# Patient Record
Sex: MALE | Race: OTHER | Employment: PART TIME | URBAN - METROPOLITAN AREA
[De-identification: names, ages, dates, MRNs, and addresses within clinical notes are randomized per-mention and may not be internally consistent; named-entity substitution may affect disease eponyms.]

---

## 2017-03-16 RX ORDER — DESLORATADINE 5 MG/1
5 TABLET ORAL
Qty: 30 TAB | Refills: 5 | Status: SHIPPED | OUTPATIENT
Start: 2017-03-16 | End: 2017-04-14

## 2017-04-14 ENCOUNTER — OFFICE VISIT (OUTPATIENT)
Dept: PEDIATRICS CLINIC | Age: 22
End: 2017-04-14

## 2017-04-14 VITALS
TEMPERATURE: 98 F | SYSTOLIC BLOOD PRESSURE: 117 MMHG | WEIGHT: 120.1 LBS | BODY MASS INDEX: 17.79 KG/M2 | HEIGHT: 69 IN | DIASTOLIC BLOOD PRESSURE: 73 MMHG | HEART RATE: 67 BPM | OXYGEN SATURATION: 98 %

## 2017-04-14 DIAGNOSIS — H10.10 ALLERGIC CONJUNCTIVITIS, UNSPECIFIED LATERALITY: ICD-10-CM

## 2017-04-14 DIAGNOSIS — J30.89 NON-SEASONAL ALLERGIC RHINITIS DUE TO OTHER ALLERGIC TRIGGER: Primary | ICD-10-CM

## 2017-04-14 DIAGNOSIS — H10.13 ALLERGIC CONJUNCTIVITIS, BILATERAL: ICD-10-CM

## 2017-04-14 RX ORDER — CETIRIZINE HCL 10 MG
10 TABLET ORAL
Qty: 30 TAB | Refills: 3 | Status: SHIPPED | OUTPATIENT
Start: 2017-04-14

## 2017-04-14 RX ORDER — KETOTIFEN FUMARATE 0.35 MG/ML
1 SOLUTION/ DROPS OPHTHALMIC
Qty: 1 BOTTLE | Refills: 1 | Status: SHIPPED | OUTPATIENT
Start: 2017-04-14

## 2017-04-14 NOTE — PROGRESS NOTES
HISTORY OF PRESENT ILLNESS  Shashank Dumas is a 25 y.o. male brought by self     HPI   Here today for allergy concerns. Clarinex not working. Runny/stuffy nose and itchy eyes reported for a few days. No itchy throat or PND. No recent fevers. Otherwise eating/drinking well. Medications:  Clarinex    No Known Allergies    Review of Systems   Constitutional: Negative for fever and malaise/fatigue. HENT: Negative for ear pain. Eyes: Positive for redness. Negative for pain and discharge. Respiratory: Negative. Cardiovascular: Negative. Gastrointestinal: Negative. Skin: Negative for rash. Visit Vitals    /73    Pulse 67    Temp 98 °F (36.7 °C) (Oral)    Ht 5' 9.29\" (1.76 m)    Wt 120 lb 1.6 oz (54.5 kg)    SpO2 98%    BMI 17.59 kg/m2       Physical Exam   Constitutional: He appears well-developed and well-nourished. No distress. HENT:   Right Ear: External ear normal.   Left Ear: External ear normal.   Mouth/Throat: No oropharyngeal exudate. Congested nasal mucosa. Pale turbinates. Eyes: EOM are normal. Pupils are equal, round, and reactive to light. Right eye exhibits no discharge. Left eye exhibits no discharge. Sclera with minimal injection bilaterally. No eyelid edema. Neck: Normal range of motion. Neck supple. Cardiovascular: Normal rate and regular rhythm. Pulmonary/Chest: Effort normal and breath sounds normal. No respiratory distress. Abdominal: Soft. He exhibits no distension. There is no tenderness. Musculoskeletal: Normal range of motion. Lymphadenopathy:     He has no cervical adenopathy. Neurological: He is alert. Skin: Skin is warm. Psychiatric: He has a normal mood and affect. Thought content normal.   Nursing note and vitals reviewed. ASSESSMENT and PLAN  Encounter Diagnoses   Name Primary?     Non-seasonal allergic rhinitis due to other allergic trigger Yes    Allergic conjunctivitis, bilateral     Allergic conjunctivitis, unspecified laterality        Plan:  Orders Placed This Encounter    cetirizine (ZYRTEC) 10 mg tablet    fluticasone (FLONASE SENSIMIST) 27.5 mcg/actuation nasal spray    ketotifen (ZADITOR) 0.025 % (0.035 %) ophthalmic solution    Handout/AVS given and discussed re. Allergic rhinitis, allergic conjunctivitis, and avoidance of triggers. Follow up if sx persist, worsen, concerns.

## 2017-04-14 NOTE — PATIENT INSTRUCTIONS
Seasonal Allergies: Care Instructions  Your Care Instructions  Allergies occur when your body's defense system (immune system) overreacts to certain substances. The immune system treats a harmless substance as if it were a harmful germ or virus. Many things can cause this to happen. Examples include pollens, medicine, food, dust, animal dander, and mold. Your allergies are seasonal if you have symptoms just at certain times of the year. In that case, you are probably allergic to pollens from certain trees, grasses, or weeds. Allergies can be mild or severe. Over-the-counter allergy medicine may help with some symptoms. Read and follow all instructions on the label. Managing your allergies is an important part of staying healthy. Your doctor may suggest that you have tests to help find the cause of your allergies. When you know what things trigger your symptoms, you can avoid them. This can prevent allergy symptoms and other health problems. In some cases, immunotherapy might help. For this treatment, you get shots or use pills that have a small amount of certain allergens in them. Your body \"gets used to\" the allergen, so you react less to it over time. This kind of treatment may help prevent or reduce some allergy symptoms. Follow-up care is a key part of your treatment and safety. Be sure to make and go to all appointments, and call your doctor if you are having problems. It's also a good idea to know your test results and keep a list of the medicines you take. How can you care for yourself at home? · Be safe with medicines. Take your medicines exactly as prescribed. Call your doctor if you think you are having a problem with your medicine. · During your allergy season, keep windows closed. If you need to use air-conditioning, change or clean all filters every month. Take a shower and change your clothes after you have been outside. · Stay inside when pollen counts are high.  Vacuum once or twice a week. Use a vacuum  with a HEPA filter or a double-thickness filter. When should you call for help? Call 911 anytime you think you may need emergency care. For example, call if:  · You have symptoms of a severe allergic reaction. These may include:  ¨ Sudden raised, red areas (hives) all over your body. ¨ Swelling of the throat, mouth, lips, or tongue. ¨ Trouble breathing. ¨ Passing out (losing consciousness). Or you may feel very lightheaded or suddenly feel weak, confused, or restless. Watch closely for changes in your health, and be sure to contact your doctor if:  · You need help controlling your allergies. · You have questions about allergy testing. · You do not get better as expected. Where can you learn more? Go to http://vickiCheviaroberto.info/. Enter J912 in the search box to learn more about \"Seasonal Allergies: Care Instructions. \"  Current as of: February 12, 2016  Content Version: 11.2  © 6847-6102 [employer]. Care instructions adapted under license by [employer] (which disclaims liability or warranty for this information). If you have questions about a medical condition or this instruction, always ask your healthcare professional. [place] [address] any warranty or liability for your use of this information. Using a Nasal Steroid Spray: Care Instructions  Your Care Instructions    Your doctor may suggest using a corticosteroid nasal spray for your allergy symptoms or sinus problems. These sprays reduce the swelling inside the nose and sinuses. Unlike decongestant nasal sprays, steroid sprays won't lead to more swelling when you stop taking them. These sprays start working in a few days, but it may take several weeks before you get the full effect. Most side effects are minor. The most common complaint is a burning feeling in the nose right after the spray is used. Some people get nosebleeds.   Follow-up care is a key part of your treatment and safety. Be sure to make and go to all appointments, and call your doctor if you are having problems. It's also a good idea to know your test results and keep a list of the medicines you take. How can you care for yourself at home? Here are some tips for using these sprays:  · You may need to prime the sprayer before you use it. This means spraying it into the air a few times to make sure you get the right amount of medicine. Follow the directions on the label. · Blow your nose before you spray. This will help clear out your nostrils. · Gently sniff the medicine into your nose as you spray. Don't snort, or the medicine will go all the way into your throat where it won't do much good. · Aim the nozzle straight toward the outer wall of your nostril. This will help keep the medicine from irritating the inner walls of your nose, especially your septum (the wall that separates your left and right nostrils). · Don't blow your nose for 10 minutes or so after you spray. And try not to sneeze. · Be safe with medicines. Use this medicine exactly as prescribed. Call your doctor if you think you are having a problem with your medicine. · Clean your sprayer once a week. Read the label to learn how. When should you call for help? Call your doctor now or seek immediate medical care if:  · You don't understand how to use the medicine. · Your symptoms aren't getting better as expected. · You think you are having a side effect from the medicine. Where can you learn more? Go to http://vicki-roberto.info/. Enter V402 in the search box to learn more about \"Using a Nasal Steroid Spray: Care Instructions. \"  Current as of: September 20, 2016  Content Version: 11.2  © 3627-5583 [employer]. Care instructions adapted under license by [employer] (which disclaims liability or warranty for this information).  If you have questions about a medical condition or this instruction, always ask your healthcare professional. [place] [address] any warranty or liability for your use of this information. Allergic Conjunctivitis in Children: Care Instructions  Your Care Instructions    Allergic conjunctivitis (say \"[id number]-KUSD-[id number]-VY-tus\") is an eye problem that many children get. It is often called pinkeye. In pinkeye, the lining of the eyelid and the eye surface become red and swollen. The lining is called the conjunctiva (say \"amkp-johf-FB-vuh\"). Pinkeye can be caused by bacteria, a virus, or an allergy. Your child's pinkeye is caused by an allergy. A substance (allergen) triggers a reaction that results in the symptoms. This type of pinkeye cannot be spread from person to person. Your child may have other symptoms of an allergy, such as a runny nose. Allergic pinkeye goes away when you keep your child away from the allergen that triggers the pinkeye. Triggers include pollen, mold, and animal skin cells (dander). But because it is not always possible to stay away from triggers, your doctor may suggest eyedrops to treat the symptoms. Antibiotics do not help with allergies. Follow-up care is a key part of your child's treatment and safety. Be sure to make and go to all appointments, and call your doctor if your child is having problems. It's also a good idea to know your child's test results and keep a list of the medicines your child takes. How can you care for your child at home? Use medicines as directed  · Have your child take medicines exactly as prescribed. Call your doctor if you think your child is having a problem with his or her medicine. You will get more details on the specific medicines your doctor prescribes. · If the doctor gave your child eyedrops, use them as directed. Keep the bottle tip clean. To put in eyedrops:  ¨ Tilt your child's head back and pull his or her lower eyelid down with one finger.   ¨ Drop or squirt the medicine inside the lower lid. ¨ Have your child close the eye for 30 to 60 seconds to let the drops move around. ¨ Do not touch the tip of the bottle to your child's eyelashes or any other surface. Make your child comfortable  · Use moist cotton or a clean, wet cloth to remove the crust from your child's eyes. Wipe from the inside corner of the eye to the outside. Use a clean part of the cloth for each wipe. · Put cold or warm wet cloths on your child's eyes a few times a day if the eyes hurt or are itching. · Do not have your child wear contact lenses until the pinkeye is gone. Clean the contacts and storage case. · If your child wears disposable contacts, get out a new pair when the eyes have cleared and it is safe to wear contacts again. Avoid triggers  · Try to find what triggers the pinkeye. Then take steps to avoid it. For example:  ¨ Control animal dander and other pet allergens by keeping pets only in certain areas of your home. ¨ Avoid outdoor pollens by keeping your child inside while pollen counts are high. ¨ Control indoor mold by cleaning bathtubs and showers monthly. When should you call for help? Call your doctor now or seek immediate medical care if:  · Your child has pain in an eye, not just irritation on the surface. · Your child has a change in vision or a loss of vision. · Pinkeye lasts longer than 7 days. Watch closely for changes in your child's health, and be sure to contact your doctor if:  · Your child does not get better as expected. Where can you learn more? Go to http://vicki-roberto.info/. Enter B763 in the search box to learn more about \"Allergic Conjunctivitis in Children: Care Instructions. \"  Current as of: May 27, 2016  Content Version: 11.2  © 7607-2801 [employer]. Care instructions adapted under license by [employer] (which disclaims liability or warranty for this information).  If you have questions about a medical condition or this instruction, always ask your healthcare professional. [place] [address] any warranty or liability for your use of this information.

## 2017-04-14 NOTE — MR AVS SNAPSHOT
Visit Information Date & Time Provider Department Dept. Phone Encounter #  
 4/14/2017  4:00 PM Shy Jack, [address] [facility] [id number] Upcoming Health Maintenance Date Due Hepatitis A Peds Age 1-18 (1 of 2 - Standard Series) 4/21/1998 HPV AGE 9Y-26Y (1 of 3 - Male 3 Dose Series) 4/21/2008 DTaP/Tdap/Td series (6 - Tdap) 8/23/2008 INFLUENZA AGE 9 TO ADULT 8/1/2016 Allergies as of 4/14/2017  Review Complete On: 4/14/2017 By: Shy Jack NP No Known Allergies Current Immunizations  Never Reviewed Name Date DTaP 9/10/2001, 1/7/1999, 1997, 1997, 1997 Hep B Vaccine 5/9/1998, 1997, 1997 Hib 7/16/1998, 1997, 1997, 1997 MMR 9/11/2001, 7/16/1998 Meningococcal (MCV4P) Vaccine 9/20/2013 Poliovirus vaccine 9/11/2001, 1997, 1997, 1997 TB Skin Test (PPD) Intradermal 7/24/2015 10:00 AM  
 Td 8/22/2008 Varicella Virus Vaccine 9/20/2013, 9/8/1999 Not reviewed this visit You Were Diagnosed With   
  
 Codes Comments Non-seasonal allergic rhinitis due to other allergic trigger    -  Primary ICD-10-CM: J30.89 ICD-9-CM: 477.8 Allergic conjunctivitis, bilateral     ICD-10-CM: H10.13 ICD-9-CM: 372.14 Allergic conjunctivitis, unspecified laterality     ICD-10-CM: H10.10 ICD-9-CM: 372.14 Vitals BP Pulse Temp Height(growth percentile) Weight(growth percentile) SpO2  
 117/73 (32 %/ 29 %)* 67 98 °F (36.7 °C) (Oral) 5' 9.29\" (1.76 m) (45 %, Z= -0.12) 120 lb 1.6 oz (54.5 kg) (4 %, Z= -1.81) 98% BMI Smoking Status 17.59 kg/m2 (<1 %, Z= -2.58) Never Smoker *BP percentiles are based on NHBPEP's 4th Report Growth percentiles are based on CDC 2-20 Years data. Vitals History BMI and BSA Data Body Mass Index Body Surface Area  
 17.59 kg/m 2 1.63 m 2 Preferred Pharmacy Pharmacy Name Phone [facility]/PH[facility]ACY #[id number] [facility] [phone] Your Updated Medication List  
  
   
This list is accurate as of: 17  4:19 PM.  Always use your most recent med list.  
  
  
  
  
 cetirizine 10 mg tablet Commonly known as:  ZYRTEC Take 1 Tab by mouth daily as needed for Allergies. fluticasone 27.5 mcg/actuation nasal spray Commonly known as:  Matt Minks 2 Sprays by Nasal route daily. Use as needed for runny, itchy nose. ketotifen 0.025 % (0.035 %) ophthalmic solution Commonly known as:  ZADITOR Administer 1 Drop to both eyes two (2) times daily as needed. (for itchy, red eyes)  
  
 mometasone 0.1 % ointment Commonly known as:  Pam Cortes Apply  to affected area daily. (thin layer applied to dry, red skin, including eyelids or corners of mouth) Prescriptions Sent to Pharmacy Refills  
 cetirizine (ZYRTEC) 10 mg tablet 3 Sig: Take 1 Tab by mouth daily as needed for Allergies. Class: Normal  
 Pharmacy: [facility]/pharmacy [address] [place] [address] Ph #: [phone] Route: Oral  
 fluticasone (FLONASE SENSIMIST) 27.5 mcg/actuation nasal spray 3 Si Sprays by Nasal route daily. Use as needed for runny, itchy nose. Class: Normal  
 [facility] [address] [place] [address] Ph #: [phone] Route: Nasal  
 ketotifen (ZADITOR) 0.025 % (0.035 %) ophthalmic solution 1 Sig: Administer 1 Drop to both eyes two (2) times daily as needed. (for itchy, red eyes) Class: Normal  
 Pharmacy: [facility]/pharmacy [address] [place] [address] Ph #: [phone] Route: Both Eyes Patient Instructions Seasonal Allergies: Care Instructions Your Care Instructions Allergies occur when your body's defense system (immune system) overreacts to certain substances.  The immune system treats a harmless substance as if it were a harmful germ or virus. Many things can cause this to happen. Examples include pollens, medicine, food, dust, animal dander, and mold. Your allergies are seasonal if you have symptoms just at certain times of the year. In that case, you are probably allergic to pollens from certain trees, grasses, or weeds. Allergies can be mild or severe. Over-the-counter allergy medicine may help with some symptoms. Read and follow all instructions on the label. Managing your allergies is an important part of staying healthy. Your doctor may suggest that you have tests to help find the cause of your allergies. When you know what things trigger your symptoms, you can avoid them. This can prevent allergy symptoms and other health problems. In some cases, immunotherapy might help. For this treatment, you get shots or use pills that have a small amount of certain allergens in them. Your body \"gets used to\" the allergen, so you react less to it over time. This kind of treatment may help prevent or reduce some allergy symptoms. Follow-up care is a key part of your treatment and safety. Be sure to make and go to all appointments, and call your doctor if you are having problems. It's also a good idea to know your test results and keep a list of the medicines you take. How can you care for yourself at home? · Be safe with medicines. Take your medicines exactly as prescribed. Call your doctor if you think you are having a problem with your medicine. · During your allergy season, keep windows closed. If you need to use air-conditioning, change or clean all filters every month. Take a shower and change your clothes after you have been outside. · Stay inside when pollen counts are high. Vacuum once or twice a week. Use a vacuum  with a HEPA filter or a double-thickness filter. When should you call for help? Call 911 anytime you think you may need emergency care. For example, call if: · You have symptoms of a severe allergic reaction. These may include: 
¨ Sudden raised, red areas (hives) all over your body. ¨ Swelling of the throat, mouth, lips, or tongue. ¨ Trouble breathing. ¨ Passing out (losing consciousness). Or you may feel very lightheaded or suddenly feel weak, confused, or restless. Watch closely for changes in your health, and be sure to contact your doctor if: 
· You need help controlling your allergies. · You have questions about allergy testing. · You do not get better as expected. Where can you learn more? Go to http://vicki-roberto.info/. Enter J912 in the search box to learn more about \"Seasonal Allergies: Care Instructions. \" Current as of: February 12, 2016 Content Version: 11.2 © 1997-5656 [employer]. Care instructions adapted under license by [employer] (which disclaims liability or warranty for this information). If you have questions about a medical condition or this instruction, always ask your healthcare professional. [place] [address] any warranty or liability for your use of this information. Using a Nasal Steroid Spray: Care Instructions Your Care Instructions Your doctor may suggest using a corticosteroid nasal spray for your allergy symptoms or sinus problems. These sprays reduce the swelling inside the nose and sinuses. Unlike decongestant nasal sprays, steroid sprays won't lead to more swelling when you stop taking them. These sprays start working in a few days, but it may take several weeks before you get the full effect. Most side effects are minor. The most common complaint is a burning feeling in the nose right after the spray is used. Some people get nosebleeds. Follow-up care is a key part of your treatment and safety. Be sure to make and go to all appointments, and call your doctor if you are having problems.  It's also a good idea to know your test results and keep a list of the medicines you take. How can you care for yourself at home? Here are some tips for using these sprays: 
· You may need to prime the sprayer before you use it. This means spraying it into the air a few times to make sure you get the right amount of medicine. Follow the directions on the label. · Blow your nose before you spray. This will help clear out your nostrils. · Gently sniff the medicine into your nose as you spray. Don't snort, or the medicine will go all the way into your throat where it won't do much good. · Aim the nozzle straight toward the outer wall of your nostril. This will help keep the medicine from irritating the inner walls of your nose, especially your septum (the wall that separates your left and right nostrils). · Don't blow your nose for 10 minutes or so after you spray. And try not to sneeze. · Be safe with medicines. Use this medicine exactly as prescribed. Call your doctor if you think you are having a problem with your medicine. · Clean your sprayer once a week. Read the label to learn how. When should you call for help? Call your doctor now or seek immediate medical care if: 
· You don't understand how to use the medicine. · Your symptoms aren't getting better as expected. · You think you are having a side effect from the medicine. Where can you learn more? Go to http://vicki-roberto.info/. Enter Q312 in the search box to learn more about \"Using a Nasal Steroid Spray: Care Instructions. \" Current as of: September 20, 2016 Content Version: 11.2 © 3422-1471 [employer]. Care instructions adapted under license by [employer] (which disclaims liability or warranty for this information). If you have questions about a medical condition or this instruction, always ask your healthcare professional. [address] any warranty or liability for your use of this information. Allergic Conjunctivitis in Children: Care Instructions Your Care Instructions Allergic conjunctivitis (say \"qzo-BMGF-rms-VY-tus\") is an eye problem that many children get. It is often called pinkeye. In pinkeye, the lining of the eyelid and the eye surface become red and swollen. The lining is called the conjunctiva (say \"vhkb-agpv-WV-vuh\"). Pinkeye can be caused by bacteria, a virus, or an allergy. Your child's pinkeye is caused by an allergy. A substance (allergen) triggers a reaction that results in the symptoms. This type of pinkeye cannot be spread from person to person. Your child may have other symptoms of an allergy, such as a runny nose. Allergic pinkeye goes away when you keep your child away from the allergen that triggers the pinkeye. Triggers include pollen, mold, and animal skin cells (dander). But because it is not always possible to stay away from triggers, your doctor may suggest eyedrops to treat the symptoms. Antibiotics do not help with allergies. Follow-up care is a key part of your child's treatment and safety. Be sure to make and go to all appointments, and call your doctor if your child is having problems. It's also a good idea to know your child's test results and keep a list of the medicines your child takes. How can you care for your child at home? Use medicines as directed · Have your child take medicines exactly as prescribed. Call your doctor if you think your child is having a problem with his or her medicine. You will get more details on the specific medicines your doctor prescribes. · If the doctor gave your child eyedrops, use them as directed. Keep the bottle tip clean. To put in eyedrops: ¨ Tilt your child's head back and pull his or her lower eyelid down with one finger. ¨ Drop or squirt the medicine inside the lower lid. ¨ Have your child close the eye for 30 to 60 seconds to let the drops move around. ¨ Do not touch the tip of the bottle to your child's eyelashes or any other surface. Make your child comfortable · Use moist cotton or a clean, wet cloth to remove the crust from your child's eyes. Wipe from the inside corner of the eye to the outside. Use a clean part of the cloth for each wipe. · Put cold or warm wet cloths on your child's eyes a few times a day if the eyes hurt or are itching. · Do not have your child wear contact lenses until the pinkeye is gone. Clean the contacts and storage case. · If your child wears disposable contacts, get out a new pair when the eyes have cleared and it is safe to wear contacts again. Avoid triggers · Try to find what triggers the pinkeye. Then take steps to avoid it. For example: ¨ Control animal dander and other pet allergens by keeping pets only in certain areas of your home. ¨ Avoid outdoor pollens by keeping your child inside while pollen counts are high. ¨ Control indoor mold by cleaning bathtubs and showers monthly. When should you call for help? Call your doctor now or seek immediate medical care if: 
· Your child has pain in an eye, not just irritation on the surface. · Your child has a change in vision or a loss of vision. · Pinkeye lasts longer than 7 days. Watch closely for changes in your child's health, and be sure to contact your doctor if: 
· Your child does not get better as expected. Where can you learn more? Go to http://vicki-roberto.info/. Enter V589 in the search box to learn more about \"Allergic Conjunctivitis in Children: Care Instructions. \" Current as of: May 27, 2016 Content Version: 11.2 © 7124-5803 [employer]. Care instructions adapted under license by [employer] (which disclaims liability or warranty for this information).  If you have questions about a medical condition or this instruction, always ask your healthcare professional. [employer] disclaims any warranty or liability for your use of this information. Introducing [facility]! [address] introduces [employer] patient portal. Now you can access parts of your medical record, email your doctor's office, and request medication refills online. 1. In your internet browser, go to [email or web address]. [email or web address] 2. Click on the First Time User? Click Here link in the Sign In box. You will see the New Member Sign Up page. 3. Enter your [employer] Access Code exactly as it appears below. You will not need to use this code after youve completed the sign-up process. If you do not sign up before the expiration date, you must request a new code. · [employer] Access Code: [email or web address] Expires: 7/13/2017  4:16 PM 
 
4. Enter the last four digits of your Social Security Number (xxxx) and Date of Birth (mm/dd/yyyy) as indicated and click Submit. You will be taken to the next sign-up page. 5. Create a [employer] ID. This will be your [employer] login ID and cannot be changed, so think of one that is secure and easy to remember. 6. Create a [employer] password. You can change your password at any time. 7. Enter your Password Reset Question and Answer. This can be used at a later time if you forget your password. 8. Enter your e-mail address. You will receive e-mail notification when new information is available in [address] 9. Click Sign Up. You can now view and download portions of your medical record. 10. Click the Download Summary menu link to download a portable copy of your medical information. If you have questions, please visit the Frequently Asked Questions section of the [employer] website. Remember, [employer] is NOT to be used for urgent needs. For medical emergencies, dial 911. Now available from your iPhone and Android! Please provide this summary of care documentation to your next provider. Your primary care clinician is listed as Dave Hernandez. If you have any questions after today's visit, please call [phone].

## 2018-12-12 RX ORDER — LANOLIN ALCOHOL/MO/W.PET/CERES
3 CREAM (GRAM) TOPICAL
COMMUNITY

## 2018-12-12 RX ORDER — ACETAMINOPHEN 325 MG/1
500 TABLET ORAL
COMMUNITY

## 2018-12-12 NOTE — PERIOP NOTES
PHONE INTERVIEW FOR PAT COMPLETED WITH PATIENT. PRE-OP INSTRUCTIONS REVIEWED WITH PATIENT. INSTRUCTIONS PROVIDED REGARDING PRESCRIBED  MEDICATIONS AND OVER-THE-COUNTER MEDICATIONS TO STOP/TAKE PRIOR TO SURGERY. INSTRUCTED NPO AFTER MIDNIGHT THE NIGHT BEFORE SURGERY. LOCATION OF [facility] VERIFIED WITH PATIENT AND HE WAS GIVEN OPPORTUNITY TO ASK QUESTIONS.

## 2018-12-20 ENCOUNTER — ANESTHESIA (OUTPATIENT)
Dept: SURGERY | Age: 23
End: 2018-12-20
Payer: COMMERCIAL

## 2018-12-20 ENCOUNTER — HOSPITAL ENCOUNTER (OUTPATIENT)
Age: 23
Setting detail: OUTPATIENT SURGERY
Discharge: HOME | End: 2018-12-20
Attending: OTOLARYNGOLOGY | Admitting: OTOLARYNGOLOGY
Payer: COMMERCIAL

## 2018-12-20 ENCOUNTER — ANESTHESIA EVENT (OUTPATIENT)
Dept: SURGERY | Age: 23
End: 2018-12-20
Payer: COMMERCIAL

## 2018-12-20 VITALS
TEMPERATURE: 98 F | WEIGHT: 125 LBS | HEIGHT: 70 IN | RESPIRATION RATE: 16 BRPM | BODY MASS INDEX: 17.9 KG/M2 | OXYGEN SATURATION: 97 % | DIASTOLIC BLOOD PRESSURE: 68 MMHG | HEART RATE: 71 BPM | SYSTOLIC BLOOD PRESSURE: 104 MMHG

## 2018-12-20 DIAGNOSIS — J34.2 DEVIATED NASAL SEPTUM: Primary | ICD-10-CM

## 2018-12-20 PROCEDURE — 77030032490 HC SLV COMPR SCD KNE COVD -B: Performed by: OTOLARYNGOLOGY

## 2018-12-20 PROCEDURE — 77030002996 HC SUT SLK J&J -A: Performed by: OTOLARYNGOLOGY

## 2018-12-20 PROCEDURE — 74011250637 HC RX REV CODE- 250/637: Performed by: OTOLARYNGOLOGY

## 2018-12-20 PROCEDURE — 76210000020 HC REC RM PH II FIRST 0.5 HR: Performed by: OTOLARYNGOLOGY

## 2018-12-20 PROCEDURE — 74011000250 HC RX REV CODE- 250: Performed by: OTOLARYNGOLOGY

## 2018-12-20 PROCEDURE — 77030002966 HC SUT PDS J&J -A: Performed by: OTOLARYNGOLOGY

## 2018-12-20 PROCEDURE — 77030008355 HC SPLNT NSL EXT MEDT -B: Performed by: OTOLARYNGOLOGY

## 2018-12-20 PROCEDURE — 74011250636 HC RX REV CODE- 250/636: Performed by: OTOLARYNGOLOGY

## 2018-12-20 PROCEDURE — 74011250636 HC RX REV CODE- 250/636

## 2018-12-20 PROCEDURE — 77030026438 HC STYL ET INTUB CARD -A: Performed by: ANESTHESIOLOGY

## 2018-12-20 PROCEDURE — 77030002888 HC SUT CHRMC J&J -A: Performed by: OTOLARYNGOLOGY

## 2018-12-20 PROCEDURE — 76210000006 HC OR PH I REC 0.5 TO 1 HR: Performed by: OTOLARYNGOLOGY

## 2018-12-20 PROCEDURE — 77030020782 HC GWN BAIR PAWS FLX 3M -B

## 2018-12-20 PROCEDURE — 76060000035 HC ANESTHESIA 2 TO 2.5 HR: Performed by: OTOLARYNGOLOGY

## 2018-12-20 PROCEDURE — 76010000131 HC OR TIME 2 TO 2.5 HR: Performed by: OTOLARYNGOLOGY

## 2018-12-20 PROCEDURE — 77030002974 HC SUT PLN J&J -A: Performed by: OTOLARYNGOLOGY

## 2018-12-20 PROCEDURE — 77030006907 HC BLD SNUS SHV MEDT -C: Performed by: OTOLARYNGOLOGY

## 2018-12-20 PROCEDURE — 74011000250 HC RX REV CODE- 250

## 2018-12-20 PROCEDURE — 77030011645 HC PK NSL DOYLE MEDT -B: Performed by: OTOLARYNGOLOGY

## 2018-12-20 PROCEDURE — 77030018836 HC SOL IRR NACL ICUM -A: Performed by: OTOLARYNGOLOGY

## 2018-12-20 PROCEDURE — 74011250636 HC RX REV CODE- 250/636: Performed by: ANESTHESIOLOGY

## 2018-12-20 PROCEDURE — 77030008684 HC TU ET CUF COVD -B: Performed by: ANESTHESIOLOGY

## 2018-12-20 RX ORDER — DEXAMETHASONE SODIUM PHOSPHATE 4 MG/ML
INJECTION, SOLUTION INTRA-ARTICULAR; INTRALESIONAL; INTRAMUSCULAR; INTRAVENOUS; SOFT TISSUE AS NEEDED
Status: DISCONTINUED | OUTPATIENT
Start: 2018-12-20 | End: 2018-12-20 | Stop reason: HOSPADM

## 2018-12-20 RX ORDER — CEPHALEXIN 500 MG/1
500 CAPSULE ORAL 3 TIMES DAILY
Qty: 21 CAP | Refills: 0 | Status: SHIPPED | OUTPATIENT
Start: 2018-12-20 | End: 2018-12-27

## 2018-12-20 RX ORDER — LIDOCAINE HYDROCHLORIDE 10 MG/ML
0.1 INJECTION, SOLUTION EPIDURAL; INFILTRATION; INTRACAUDAL; PERINEURAL AS NEEDED
Status: DISCONTINUED | OUTPATIENT
Start: 2018-12-20 | End: 2018-12-20 | Stop reason: HOSPADM

## 2018-12-20 RX ORDER — FENTANYL CITRATE 50 UG/ML
25 INJECTION, SOLUTION INTRAMUSCULAR; INTRAVENOUS
Status: DISCONTINUED | OUTPATIENT
Start: 2018-12-20 | End: 2018-12-20 | Stop reason: SDUPTHER

## 2018-12-20 RX ORDER — SODIUM CHLORIDE 0.9 % (FLUSH) 0.9 %
5-10 SYRINGE (ML) INJECTION EVERY 8 HOURS
Status: DISCONTINUED | OUTPATIENT
Start: 2018-12-20 | End: 2018-12-20 | Stop reason: HOSPADM

## 2018-12-20 RX ORDER — FENTANYL CITRATE 50 UG/ML
50 INJECTION, SOLUTION INTRAMUSCULAR; INTRAVENOUS AS NEEDED
Status: DISCONTINUED | OUTPATIENT
Start: 2018-12-20 | End: 2018-12-20 | Stop reason: HOSPADM

## 2018-12-20 RX ORDER — MIDAZOLAM HYDROCHLORIDE 1 MG/ML
1 INJECTION, SOLUTION INTRAMUSCULAR; INTRAVENOUS AS NEEDED
Status: DISCONTINUED | OUTPATIENT
Start: 2018-12-20 | End: 2018-12-20 | Stop reason: HOSPADM

## 2018-12-20 RX ORDER — SODIUM CHLORIDE 9 MG/ML
1000 INJECTION, SOLUTION INTRAVENOUS CONTINUOUS
Status: DISCONTINUED | OUTPATIENT
Start: 2018-12-20 | End: 2018-12-20 | Stop reason: HOSPADM

## 2018-12-20 RX ORDER — CEFAZOLIN SODIUM/WATER 2 G/20 ML
2 SYRINGE (ML) INTRAVENOUS ONCE
Status: COMPLETED | OUTPATIENT
Start: 2018-12-20 | End: 2018-12-20

## 2018-12-20 RX ORDER — MORPHINE SULFATE 2 MG/ML
2 INJECTION, SOLUTION INTRAMUSCULAR; INTRAVENOUS
Status: DISCONTINUED | OUTPATIENT
Start: 2018-12-20 | End: 2018-12-20 | Stop reason: HOSPADM

## 2018-12-20 RX ORDER — OXYCODONE AND ACETAMINOPHEN 5; 325 MG/1; MG/1
2 TABLET ORAL
Qty: 30 TAB | Refills: 0 | Status: SHIPPED | OUTPATIENT
Start: 2018-12-20

## 2018-12-20 RX ORDER — ONDANSETRON 8 MG/1
8 TABLET, ORALLY DISINTEGRATING ORAL
Qty: 5 TAB | Refills: 1 | Status: SHIPPED | OUTPATIENT
Start: 2018-12-20

## 2018-12-20 RX ORDER — MORPHINE SULFATE 10 MG/ML
2 INJECTION, SOLUTION INTRAMUSCULAR; INTRAVENOUS
Status: DISCONTINUED | OUTPATIENT
Start: 2018-12-20 | End: 2018-12-20 | Stop reason: SDUPTHER

## 2018-12-20 RX ORDER — SUCCINYLCHOLINE CHLORIDE 20 MG/ML
INJECTION INTRAMUSCULAR; INTRAVENOUS AS NEEDED
Status: DISCONTINUED | OUTPATIENT
Start: 2018-12-20 | End: 2018-12-20 | Stop reason: HOSPADM

## 2018-12-20 RX ORDER — MIDAZOLAM HYDROCHLORIDE 1 MG/ML
0.5 INJECTION, SOLUTION INTRAMUSCULAR; INTRAVENOUS
Status: DISCONTINUED | OUTPATIENT
Start: 2018-12-20 | End: 2018-12-20 | Stop reason: SDUPTHER

## 2018-12-20 RX ORDER — ROCURONIUM BROMIDE 10 MG/ML
INJECTION, SOLUTION INTRAVENOUS AS NEEDED
Status: DISCONTINUED | OUTPATIENT
Start: 2018-12-20 | End: 2018-12-20 | Stop reason: HOSPADM

## 2018-12-20 RX ORDER — SODIUM CHLORIDE, SODIUM LACTATE, POTASSIUM CHLORIDE, CALCIUM CHLORIDE 600; 310; 30; 20 MG/100ML; MG/100ML; MG/100ML; MG/100ML
75 INJECTION, SOLUTION INTRAVENOUS CONTINUOUS
Status: DISCONTINUED | OUTPATIENT
Start: 2018-12-20 | End: 2018-12-20 | Stop reason: HOSPADM

## 2018-12-20 RX ORDER — MIDAZOLAM HYDROCHLORIDE 1 MG/ML
0.5 INJECTION, SOLUTION INTRAMUSCULAR; INTRAVENOUS
Status: DISCONTINUED | OUTPATIENT
Start: 2018-12-20 | End: 2018-12-20 | Stop reason: HOSPADM

## 2018-12-20 RX ORDER — SODIUM CHLORIDE 0.9 % (FLUSH) 0.9 %
5-10 SYRINGE (ML) INJECTION AS NEEDED
Status: DISCONTINUED | OUTPATIENT
Start: 2018-12-20 | End: 2018-12-20 | Stop reason: HOSPADM

## 2018-12-20 RX ORDER — SODIUM CHLORIDE 0.9 % (FLUSH) 0.9 %
5-10 SYRINGE (ML) INJECTION AS NEEDED
Status: DISCONTINUED | OUTPATIENT
Start: 2018-12-20 | End: 2018-12-20 | Stop reason: SDUPTHER

## 2018-12-20 RX ORDER — FENTANYL CITRATE 50 UG/ML
25 INJECTION, SOLUTION INTRAMUSCULAR; INTRAVENOUS
Status: DISCONTINUED | OUTPATIENT
Start: 2018-12-20 | End: 2018-12-20 | Stop reason: HOSPADM

## 2018-12-20 RX ORDER — ACETAMINOPHEN 10 MG/ML
INJECTION, SOLUTION INTRAVENOUS AS NEEDED
Status: DISCONTINUED | OUTPATIENT
Start: 2018-12-20 | End: 2018-12-20 | Stop reason: HOSPADM

## 2018-12-20 RX ORDER — FENTANYL CITRATE 50 UG/ML
INJECTION, SOLUTION INTRAMUSCULAR; INTRAVENOUS AS NEEDED
Status: DISCONTINUED | OUTPATIENT
Start: 2018-12-20 | End: 2018-12-20 | Stop reason: HOSPADM

## 2018-12-20 RX ORDER — SODIUM CHLORIDE 9 MG/ML
50 INJECTION, SOLUTION INTRAVENOUS CONTINUOUS
Status: DISCONTINUED | OUTPATIENT
Start: 2018-12-20 | End: 2018-12-20 | Stop reason: HOSPADM

## 2018-12-20 RX ORDER — ONDANSETRON 2 MG/ML
4 INJECTION INTRAMUSCULAR; INTRAVENOUS AS NEEDED
Status: DISCONTINUED | OUTPATIENT
Start: 2018-12-20 | End: 2018-12-20 | Stop reason: HOSPADM

## 2018-12-20 RX ORDER — MORPHINE SULFATE 10 MG/ML
2 INJECTION, SOLUTION INTRAMUSCULAR; INTRAVENOUS
Status: DISCONTINUED | OUTPATIENT
Start: 2018-12-20 | End: 2018-12-20 | Stop reason: HOSPADM

## 2018-12-20 RX ORDER — MIDAZOLAM HYDROCHLORIDE 1 MG/ML
INJECTION, SOLUTION INTRAMUSCULAR; INTRAVENOUS AS NEEDED
Status: DISCONTINUED | OUTPATIENT
Start: 2018-12-20 | End: 2018-12-20 | Stop reason: HOSPADM

## 2018-12-20 RX ORDER — LIDOCAINE HYDROCHLORIDE AND EPINEPHRINE 10; 10 MG/ML; UG/ML
INJECTION, SOLUTION INFILTRATION; PERINEURAL AS NEEDED
Status: DISCONTINUED | OUTPATIENT
Start: 2018-12-20 | End: 2018-12-20 | Stop reason: HOSPADM

## 2018-12-20 RX ORDER — SODIUM CHLORIDE, SODIUM LACTATE, POTASSIUM CHLORIDE, CALCIUM CHLORIDE 600; 310; 30; 20 MG/100ML; MG/100ML; MG/100ML; MG/100ML
125 INJECTION, SOLUTION INTRAVENOUS CONTINUOUS
Status: DISCONTINUED | OUTPATIENT
Start: 2018-12-20 | End: 2018-12-20 | Stop reason: HOSPADM

## 2018-12-20 RX ORDER — SODIUM CHLORIDE 0.9 % (FLUSH) 0.9 %
5-10 SYRINGE (ML) INJECTION EVERY 8 HOURS
Status: DISCONTINUED | OUTPATIENT
Start: 2018-12-20 | End: 2018-12-20 | Stop reason: SDUPTHER

## 2018-12-20 RX ORDER — HYDROCODONE BITARTRATE AND ACETAMINOPHEN 5; 325 MG/1; MG/1
1 TABLET ORAL AS NEEDED
Status: DISCONTINUED | OUTPATIENT
Start: 2018-12-20 | End: 2018-12-20 | Stop reason: HOSPADM

## 2018-12-20 RX ORDER — OXYMETAZOLINE HCL 0.05 %
2 SPRAY, NON-AEROSOL (ML) NASAL
Status: DISCONTINUED | OUTPATIENT
Start: 2018-12-20 | End: 2018-12-20 | Stop reason: HOSPADM

## 2018-12-20 RX ORDER — LIDOCAINE HYDROCHLORIDE 20 MG/ML
INJECTION, SOLUTION EPIDURAL; INFILTRATION; INTRACAUDAL; PERINEURAL AS NEEDED
Status: DISCONTINUED | OUTPATIENT
Start: 2018-12-20 | End: 2018-12-20 | Stop reason: HOSPADM

## 2018-12-20 RX ORDER — BACITRACIN 500 UNIT/G
2 PACKET (EA) TOPICAL
Status: COMPLETED | OUTPATIENT
Start: 2018-12-20 | End: 2018-12-20

## 2018-12-20 RX ORDER — SODIUM CHLORIDE 9 MG/ML
25 INJECTION, SOLUTION INTRAVENOUS CONTINUOUS
Status: DISCONTINUED | OUTPATIENT
Start: 2018-12-20 | End: 2018-12-20 | Stop reason: HOSPADM

## 2018-12-20 RX ORDER — HYDROMORPHONE HYDROCHLORIDE 2 MG/ML
INJECTION, SOLUTION INTRAMUSCULAR; INTRAVENOUS; SUBCUTANEOUS AS NEEDED
Status: DISCONTINUED | OUTPATIENT
Start: 2018-12-20 | End: 2018-12-20 | Stop reason: HOSPADM

## 2018-12-20 RX ORDER — OXYCODONE AND ACETAMINOPHEN 5; 325 MG/1; MG/1
1 TABLET ORAL
Status: DISCONTINUED | OUTPATIENT
Start: 2018-12-20 | End: 2018-12-20 | Stop reason: SDUPTHER

## 2018-12-20 RX ORDER — DEXMEDETOMIDINE HYDROCHLORIDE 4 UG/ML
INJECTION, SOLUTION INTRAVENOUS AS NEEDED
Status: DISCONTINUED | OUTPATIENT
Start: 2018-12-20 | End: 2018-12-20 | Stop reason: HOSPADM

## 2018-12-20 RX ORDER — DEXTROSE, SODIUM CHLORIDE, SODIUM LACTATE, POTASSIUM CHLORIDE, AND CALCIUM CHLORIDE 5; .6; .31; .03; .02 G/100ML; G/100ML; G/100ML; G/100ML; G/100ML
100 INJECTION, SOLUTION INTRAVENOUS CONTINUOUS
Status: DISCONTINUED | OUTPATIENT
Start: 2018-12-20 | End: 2018-12-20 | Stop reason: HOSPADM

## 2018-12-20 RX ORDER — BACITRACIN 500 [USP'U]/G
OINTMENT TOPICAL AS NEEDED
Status: DISCONTINUED | OUTPATIENT
Start: 2018-12-20 | End: 2018-12-20 | Stop reason: HOSPADM

## 2018-12-20 RX ORDER — OXYMETAZOLINE HCL 0.05 %
2 SPRAY, NON-AEROSOL (ML) NASAL
Status: DISCONTINUED | OUTPATIENT
Start: 2018-12-20 | End: 2018-12-20

## 2018-12-20 RX ORDER — ONDANSETRON 2 MG/ML
INJECTION INTRAMUSCULAR; INTRAVENOUS AS NEEDED
Status: DISCONTINUED | OUTPATIENT
Start: 2018-12-20 | End: 2018-12-20 | Stop reason: HOSPADM

## 2018-12-20 RX ORDER — OXYCODONE AND ACETAMINOPHEN 5; 325 MG/1; MG/1
1 TABLET ORAL AS NEEDED
Status: DISCONTINUED | OUTPATIENT
Start: 2018-12-20 | End: 2018-12-20 | Stop reason: HOSPADM

## 2018-12-20 RX ORDER — DEXAMETHASONE SODIUM PHOSPHATE 10 MG/ML
8 INJECTION INTRAMUSCULAR; INTRAVENOUS ONCE
Status: COMPLETED | OUTPATIENT
Start: 2018-12-20 | End: 2018-12-20

## 2018-12-20 RX ORDER — PROPOFOL 10 MG/ML
INJECTION, EMULSION INTRAVENOUS AS NEEDED
Status: DISCONTINUED | OUTPATIENT
Start: 2018-12-20 | End: 2018-12-20 | Stop reason: HOSPADM

## 2018-12-20 RX ORDER — BACITRACIN 500 UNIT/G
PACKET (EA) TOPICAL
Status: COMPLETED
Start: 2018-12-20 | End: 2018-12-20

## 2018-12-20 RX ADMIN — DEXMEDETOMIDINE HYDROCHLORIDE 4 MCG: 4 INJECTION, SOLUTION INTRAVENOUS at 17:45

## 2018-12-20 RX ADMIN — DEXAMETHASONE SODIUM PHOSPHATE 8 MG: 10 INJECTION, SOLUTION INTRAMUSCULAR; INTRAVENOUS at 17:00

## 2018-12-20 RX ADMIN — Medication 2 PACKET: at 20:30

## 2018-12-20 RX ADMIN — BACITRACIN 2 PACKET: 500 OINTMENT TOPICAL at 20:30

## 2018-12-20 RX ADMIN — MIDAZOLAM HYDROCHLORIDE 4 MG: 1 INJECTION, SOLUTION INTRAMUSCULAR; INTRAVENOUS at 16:54

## 2018-12-20 RX ADMIN — ROCURONIUM BROMIDE 5 MG: 10 INJECTION, SOLUTION INTRAVENOUS at 17:07

## 2018-12-20 RX ADMIN — PROPOFOL 150 MG: 10 INJECTION, EMULSION INTRAVENOUS at 17:07

## 2018-12-20 RX ADMIN — SUCCINYLCHOLINE CHLORIDE 140 MG: 20 INJECTION INTRAMUSCULAR; INTRAVENOUS at 17:08

## 2018-12-20 RX ADMIN — ONDANSETRON 4 MG: 2 INJECTION INTRAMUSCULAR; INTRAVENOUS at 18:38

## 2018-12-20 RX ADMIN — Medication 2 G: at 17:20

## 2018-12-20 RX ADMIN — ONDANSETRON 4 MG: 2 INJECTION INTRAMUSCULAR; INTRAVENOUS at 17:21

## 2018-12-20 RX ADMIN — ACETAMINOPHEN 1000 MG: 10 INJECTION, SOLUTION INTRAVENOUS at 16:54

## 2018-12-20 RX ADMIN — FENTANYL CITRATE 50 MCG: 50 INJECTION, SOLUTION INTRAMUSCULAR; INTRAVENOUS at 17:09

## 2018-12-20 RX ADMIN — DEXMEDETOMIDINE HYDROCHLORIDE 8 MCG: 4 INJECTION, SOLUTION INTRAVENOUS at 17:16

## 2018-12-20 RX ADMIN — FENTANYL CITRATE 50 MCG: 50 INJECTION, SOLUTION INTRAMUSCULAR; INTRAVENOUS at 17:07

## 2018-12-20 RX ADMIN — PROPOFOL 50 MG: 10 INJECTION, EMULSION INTRAVENOUS at 17:09

## 2018-12-20 RX ADMIN — DEXMEDETOMIDINE HYDROCHLORIDE 8 MCG: 4 INJECTION, SOLUTION INTRAVENOUS at 17:40

## 2018-12-20 RX ADMIN — LIDOCAINE HYDROCHLORIDE 100 MG: 20 INJECTION, SOLUTION EPIDURAL; INFILTRATION; INTRACAUDAL; PERINEURAL at 17:07

## 2018-12-20 RX ADMIN — OXYMETAZOLINE HYDROCHLORIDE 2 SPRAY: 5 SPRAY NASAL at 20:30

## 2018-12-20 RX ADMIN — DEXAMETHASONE SODIUM PHOSPHATE 8 MG: 4 INJECTION, SOLUTION INTRA-ARTICULAR; INTRALESIONAL; INTRAMUSCULAR; INTRAVENOUS; SOFT TISSUE at 17:22

## 2018-12-20 RX ADMIN — HYDROMORPHONE HYDROCHLORIDE 0.5 MG: 2 INJECTION, SOLUTION INTRAMUSCULAR; INTRAVENOUS; SUBCUTANEOUS at 18:16

## 2018-12-20 RX ADMIN — SODIUM CHLORIDE, SODIUM LACTATE, POTASSIUM CHLORIDE, AND CALCIUM CHLORIDE 125 ML/HR: 600; 310; 30; 20 INJECTION, SOLUTION INTRAVENOUS at 14:59

## 2018-12-20 NOTE — H&P
History and Physical    Subjective:     Rustam Garcia is a 26 y.o.  male who presents with nasoseptal deformity, traumatic and nasal obstruction not amenable to medical and allergy management. History reviewed. No pertinent past medical history. Past Surgical History:   Procedure Laterality Date    HX HEENT  2017    WISDOM TEETH X4     Family History   Problem Relation Age of Onset    Breast Problems Maternal Grandmother     Heart Disease Paternal Grandmother     Heart Disease Paternal Grandfather     No Known Problems Mother     No Known Problems Father     No Known Problems Sister     No Known Problems Brother     No Known Problems Sister     No Known Problems Brother     No Known Problems Brother     Anesth Problems Neg Hx       Social History     Tobacco Use    Smoking status: Never Smoker    Smokeless tobacco: Never Used   Substance Use Topics    Alcohol use: Yes     Alcohol/week: 1.8 oz     Types: 3 Shots of liquor per week     Comment: DRINK ON WEEKENDS       Prior to Admission medications    Medication Sig Start Date End Date Taking? Authorizing Provider   melatonin 3 mg tablet Take 3 mg by mouth nightly. Yes Provider, Historical   cetirizine (ZYRTEC) 10 mg tablet Take 1 Tab by mouth daily as needed for Allergies. 4/14/17  Yes Javed Morrison NP   mometasone (ELOCON) 0.1 % ointment Apply  to affected area daily. (thin layer applied to dry, red skin, including eyelids or corners of mouth)  Patient taking differently: Apply  to affected area as needed. (thin layer applied to dry, red skin, including eyelids or corners of mouth) 3/7/16  Yes Art Viera MD   acetaminophen (TYLENOL) 325 mg tablet Take 500 mg by mouth every four (4) hours as needed for Pain. Provider, Historical   ketotifen (ZADITOR) 0.025 % (0.035 %) ophthalmic solution Administer 1 Drop to both eyes two (2) times daily as needed.  (for itchy, red eyes) 4/14/17   Javed Morrison NP No Known Allergies     Review of Systems:  A comprehensive review of systems was negative except for that written in the History of Present Illness. Objective: Intake and Output:    No intake/output data recorded. No intake/output data recorded. Physical Exam:   Visit Vitals  /74 (BP 1 Location: Right arm, BP Patient Position: At rest)   Pulse 77   Temp 98.2 °F (36.8 °C)   Resp 15   Ht 5' 10\" (1.778 m)   Wt 56.7 kg (125 lb)   SpO2 99%   BMI 17.94 kg/m²     General:  Alert, cooperative, no distress, appears stated age. Head:  Normocephalic, without obvious abnormality, atraumatic. Eyes:  Conjunctivae/corneas clear. PERRL, EOMs intact. Fundi benign   Ears:  Normal TMs and external ear canals both ears. 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 Compensatory turbinate hypertrophy. Throat: Lips, mucosa, and tongue normal. Teeth and gums normal.   Neck: Supple, symmetrical, trachea midline, no adenopathy, thyroid: no enlargement/tenderness/nodules, no carotid bruit and no JVD. Back:   Symmetric, no curvature. ROM normal. No CVA tenderness. Lungs:   Clear to auscultation bilaterally. Chest wall:  No tenderness or deformity. Heart:  Regular rate and rhythm, S1, S2 normal, no murmur, click, rub or gallop. Abdomen:   Soft, non-tender. Bowel sounds normal. No masses,  No organomegaly. Genitalia:  Normal male without lesion, discharge or tenderness. Rectal:  Normal tone, normal prostate, no masses or tenderness  Guaiac negative stool. Extremities: Extremities normal, atraumatic, no cyanosis or edema. Pulses: 2+ and symmetric all extremities. Skin: Skin color, texture, turgor normal. No rashes or lesions   Lymph nodes: Cervical, supraclavicular, and axillary nodes normal.   Neurologic: CNII-XII intact. Normal strength, sensation and reflexes throughout.          Data Review:   No results found for this or any previous visit (from the past 24 hour(s)). Assessment:     Nasoseptal deformity  Compensatory turbinate hypertrophy    Plan:     SRP, functional  Turbinate reduction.     Signed By: Elma Castellano MD     December 20, 2018

## 2018-12-20 NOTE — ROUTINE PROCESS
Patient: Cresenciano Dakin MRN: [id number]  SSN: [id number]   YOB: 1995  Age: 26 y.o. Sex: male     Patient is status post Procedure(s): RHINOPLASTY/RECONSTRUCTION OF NOSE/SEPTUM/ RESECTION OF TURBINATE BONES. Surgeon(s) and Role:     * Gaviota Jane MD - Primary    Local/Dose/Irrigation:  9mL 1% Lidocaine with Epinephrine 1:100,000                  Peripheral IV 12/20/18 Left Arm (Active)   Site Assessment Clean, dry, & intact 12/20/2018  2:58 PM   Phlebitis Assessment 0 12/20/2018  2:58 PM   Infiltration Assessment 0 12/20/2018  2:58 PM   Dressing Status Clean, dry, & intact 12/20/2018  2:58 PM   Dressing Type Transparent 12/20/2018  2:58 PM   Hub Color/Line Status Pink 12/20/2018  2:58 PM   Alcohol Cap Used Yes 12/20/2018  2:58 PM            Airway - Endotracheal Tube 12/20/18 Oral (Active)                   Dressing/Packing:  Wound Nose-DRESSING TYPE: Adhesive wound closure strips (Steri-Strips); Adhesive wound dressing (Mastisol)(Thermasplint) (12/20/18 0759)  Splint/Cast:  ]    Other:

## 2018-12-21 NOTE — PERIOP NOTES
VS stable. Pain 2/10. Discharge instructions reviewed and questions answered. PIV removed. Tolerating liquids. Ready to discharge home.

## 2018-12-21 NOTE — ANESTHESIA POSTPROCEDURE EVALUATION
Procedure(s): RHINOPLASTY/RECONSTRUCTION OF NOSE/SEPTUM/ RESECTION OF TURBINATE BONES. Anesthesia Post Evaluation        Comments: Post-Anesthesia Evaluation and Assessment    I have evaluated the patient and they are ready for PACU discharge. Patient: Cherry Garcia MRN: [id number]  SSN: [id number]   YOB: 1995  Age: 26 y.o. Sex: male      Cardiovascular Function/Vital Signs  /64   Pulse 83   Temp 37.1 °C (98.7 °F)   Resp 15   Ht 5' 10\" (1.778 m)   Wt 56.7 kg (125 lb)   SpO2 97%   BMI 17.94 kg/m²     Patient is status post General anesthesia for Procedure(s): RHINOPLASTY/RECONSTRUCTION OF NOSE/SEPTUM/ RESECTION OF TURBINATE BONES. Nausea/Vomiting: None    Postoperative hydration reviewed and adequate. Pain:  Pain Scale 1: Numeric (0 - 10) (12/20/18 1945)  Pain Intensity 1: 2 (12/20/18 1945)   Managed    Neurological Status:   Neuro (WDL): Within Defined Limits (12/20/18 1945)  Neuro  Neurologic State: Alert; Appropriate for age (12/20/18 1945)   At baseline    Mental Status, Level of Consciousness: Alert and  oriented to person, place, and time    Pulmonary Status:   O2 Device: Room air (12/20/18 1945)   Adequate oxygenation and airway patent    Complications related to anesthesia: None    Post-anesthesia assessment completed.  No concerns    Signed By: Madan Clayton MD    December 20, 2018                   Visit Vitals  /64   Pulse 83   Temp 37.1 °C (98.7 °F)   Resp 15   Ht 5' 10\" (1.778 m)   Wt 56.7 kg (125 lb)   SpO2 97%   BMI 17.94 kg/m²

## 2018-12-21 NOTE — DISCHARGE INSTRUCTIONS
Nasal Septum Repair: What to Expect at [address] may have some swelling of your nose, upper lip, cheeks, or around your eyes after nasal surgery. You may have some bruises around your nose and eyes. Your nose may be sore and will bleed. This may last for several days after surgery. The tip of your nose and your upper lip and gums may be numb. Feeling will return in a few weeks to a few months. Your sense of smell may not be as good after surgery. But it will improve and will often return to normal in 1 to 2 months. You will have a drip pad under your nose to collect mucus and blood. Change it only when it bleeds through. You may have to do this every hour for 24 hours after surgery. You will probably be able to return to work or school in a few days and to your normal routine in about 3 weeks. But this varies with your job and how much surgery you had. Most people recover fully in 1 to 2 months. You will have to visit your doctor during the 3 to 4 months after your surgery. Your doctor will check to see that your nose is healing well. This care sheet gives you a general idea about how long it will take for you to recover. But each person recovers at a different pace. Follow the steps below to get better as quickly as possible. How can you care for yourself at home? Activity    · Rest when you feel tired. Getting enough sleep will help you recover. Do not lie flat. Raise your head with two or three pillows. This can reduce swelling. Try to sleep on your back for the month after surgery. You can also sleep in a reclining chair.     · Try to walk each day. Start by walking a little more than you did the day before. Bit by bit, increase the amount you walk. Walking boosts blood flow and helps prevent pneumonia and constipation. Also, try to sit and stand as much as you can.     · For 1 week, try not to bend over or lift anything heavier than 10 pounds.  This may include a child, heavy grocery bags and milk containers, a heavy briefcase or backpack, cat litter or dog food bags, or a vacuum .     · You can take a shower or bath. Avoid swimming for 6 weeks.     · Avoid strenuous activities, such as bicycle riding, jogging, weight lifting, or aerobic exercise, for 1 week or until your doctor says it is okay.     · You may drive when you are no longer taking prescription pain pills and feel up to it. Diet    · You can eat your normal diet. If your stomach is upset, try bland, low-fat foods like plain rice, broiled chicken, toast, and yogurt.     · You may notice that your bowel movements are not regular right after your surgery. This is common. Try to avoid constipation and straining with bowel movements. You may want to take a fiber supplement every day. If you have not had a bowel movement after a couple of days, ask your doctor about taking a mild laxative. Medicines    · Your doctor will tell you if and when you can restart your medicines. He or she will also give you instructions about taking any new medicines.     · If you take blood thinners, such as warfarin (Coumadin), clopidogrel (Plavix), or aspirin, be sure to talk to your doctor. He or she will tell you if and when to start taking those medicines again. Make sure that you understand exactly what your doctor wants you to do.     · Do not take aspirin, aspirin-containing medicines, or anti-inflammatory medicines such as ibuprofen (Advil, Motrin) or naproxen (Aleve) for 3 weeks following surgery unless your doctor says it is okay.     · Take pain medicines exactly as directed. ? If the doctor gave you a prescription medicine for pain, take it as prescribed. ? Do not take two or more pain medicines at the same time unless the doctor told you to. Many pain medicines have acetaminophen, which is Tylenol. Too much acetaminophen (Tylenol) can be harmful.     · If your doctor prescribed antibiotics, take them as directed.  Do not stop taking them just because you feel better. You need to take the full course of antibiotics.     · If you think your pain medicine is making you sick to your stomach:  ? Take your medicine after meals (unless your doctor has told you not to). ? Ask your doctor for a different pain medicine. Incision care    · You will have a drip pad under your nose to collect blood. Change it only when it has bled through. You may have to do this every hour for 24 hours after surgery. When bleeding stops, you can remove it.     · If you have packing in your nose, leave it in. Your doctor will take it out. Ice and elevation    · To help with swelling and pain, put ice or a cold pack on your nose for 10 to 20 minutes at a time. Put a thin cloth between the ice and your skin.     · Sleep with your head raised up. You can also sleep in a reclining chair. Other instructions    · Do not blow your nose for 1 week after surgery.     · Do not put anything into your nose.     · If you must sneeze, open your mouth and sneeze naturally.     · After any packing is removed, use saline (saltwater) nasal washes to help keep your nasal passages open and wash out mucus and bacteria. You can buy saline nose drops at a grocery store or drugstore. Or you can make your own at home by adding 1 teaspoon of salt and 1 teaspoon of baking soda to 2 cups of distilled water. If you make your own, fill a bulb syringe with the solution, insert the tip into your nostril, and squeeze gently. Adam Matos your nose.     · You can wear your glasses when you wish. Do not wear contacts until the day after the surgery. Follow-up care is a key part of your treatment and safety. Be sure to make and go to all appointments, and call your doctor if you are having problems. It's also a good idea to know your test results and keep a list of the medicines you take. When should you call for help? Call 911 anytime you think you may need emergency care.  For example, call if:    · You passed out (lost consciousness).     · You have severe trouble breathing.    Call your doctor now or seek immediate medical care if:    · You have bleeding through the nasal packing that is not slowing.     · You have symptoms of infection, such as:  ? Increased pain, swelling, warmth, or redness. ? Red streaks coming from the area. ? Pus draining from the area. ? A fever.     · You have pain that does not get better after you take pain pills.    Watch closely for any changes in your health, and be sure to contact your doctor if:    · You do not get better as expected. Where can you learn more? Go to http://vicki-roberto.info/. Enter F648 in the search box to learn more about \"Nasal Septum Repair: What to Expect at Home. \"  Current as of: March 28, 2018  Content Version: 11.8  © 2770-6093 [employer]. Care instructions adapted under license by [employer] (which disclaims liability or warranty for this information). If you have questions about a medical condition or this instruction, always ask your healthcare professional. [address] any warranty or liability for your use of this information. Rhinoplasty: What to Expect at [address] [place] [address] is surgery to reshape your nose. It can be done to improve your appearance, fix a birth defect, or help you breathe better. You may have stitches or staples in your cuts (incisions). Stitches inside your nose and mouth will usually dissolve on their own. If you have staples, your doctor will take these out in the first week. A bandage will cover your nose. You may have a plastic or plaster splint to protect and help keep the new shape of your nose. You may have a \"nasal drip pad\" under your nostrils to collect any blood that may drip from your nose. Your doctor will show you how to change the pad as needed.  You may have packing material inside your nose to reduce bleeding and swelling. Packing and the nasal drip pad will be removed within 2 days after surgery. The splint will be removed in about a week. Your nose will be bruised and swollen, and you may get dark bruises around your eyes. The swelling may get worse before it gets better. Most of the swelling should go away in 3 to 4 weeks. You will have some pain in your nose, and you may have a headache. Your nose may be stuffy and you may have trouble breathing for a short time. The skin on the tip of your nose may be numb. You may have some itching or shooting pain as the feeling returns. If bones were broken during your surgery, you will need to avoid injury to your nose for about 3 months. In 3 to 4 weeks, you should have a good idea as to what your nose will look like. It can take up to a year to see the final result. This care sheet gives you a general idea about how long it will take for you to recover. But each person recovers at a different pace. Follow the steps below to feel better as quickly as possible. How can you care for yourself at home? Activity    · Rest when you feel tired. Getting enough sleep will help you recover.     · Keep your head raised for several days after surgery. Sleep with your head up by using 2 or 3 pillows.     · Try to walk each day. Start by walking a little more than you did the day before. Bit by bit, increase the amount you walk.     · You will probably need to take about 1 week off from work. It depends on the type of work you do and how you feel.     · Avoid strenuous activities, such as bicycle riding, jogging, weight lifting, or aerobic exercise, for 2 to 3 weeks or until your doctor says it is okay.     · Ask your doctor when you can drive again.     · Do not blow your nose for at least 1 week after surgery. Wipe your nose gently with a tissue.  If you need to sneeze, do it with your mouth open.     · Ask your doctor when it'll be okay for you to bend over.     · Do not rub your nose for 8 weeks. Use sunblock on your nose and wear a hat with a brim to avoid getting a sunburn. Put on sunblock or makeup gently.     · Do not swim for a week. Diet    · You may notice that your bowel movements are not regular right after your surgery. This is common. Try to avoid constipation and straining with bowel movements. You may want to take a fiber supplement every day. If you have not had a bowel movement after a couple of days, ask your doctor about taking a mild laxative. Medicines    · Your doctor will tell you if and when you can restart your medicines. He or she will also give you instructions about taking any new medicines.     · If you take blood thinners, such as warfarin (Coumadin), clopidogrel (Plavix), or aspirin, be sure to talk to your doctor. He or she will tell you if and when to start taking those medicines again. Make sure that you understand exactly what your doctor wants you to do.     · Be safe with medicines. Take pain medicines exactly as directed. ? If the doctor gave you a prescription medicine for pain, take it as prescribed. ? If you are not taking a prescription pain medicine, ask your doctor if you can take an over-the-counter medicine.     · If your doctor prescribed antibiotics, take them as directed. Do not stop taking them just because you feel better. You need to take the full course of antibiotics.     · If you think your pain medicine is making you sick to your stomach:  ? Take your medicine after meals (unless your doctor has told you not to). ? Ask your doctor for a different pain medicine. Incision care    · After the stitches or staples are out, you may wash the incision with soap and water and gently dry the area.     · If you have strips of tape on the incision the doctor made, leave the tape on for a week or until it falls off. Or follow your doctor's instructions for removing the tape.    Other instructions    · Put ice or a cold pack on your nose for 10 to 20 minutes at a time. Try to do this every 1 to 2 hours for the next 3 days (when you are awake) or until the swelling goes down. A bag of frozen peas or corn works well for this, because it molds to the shape of your face. Put a thin cloth between the ice and your skin.     · Do not set glasses on your nose for 4 weeks. Instead, wrap a piece of tape around the bridge of the glasses and attach the tape to your forehead.     · For 1 week, avoid wearing clothes that you pull over your head. Follow-up care is a key part of your treatment. Be sure to make and go to all appointments, and call your doctor if you are having problems. It's also a good idea to know your test results and keep a list of the medicines you take. When should you call for help? Call 911 anytime you think you may need emergency care. For example, call if:    · You passed out (lost consciousness).     · You have severe trouble breathing.     · You have sudden chest pain and shortness of breath, or you cough up blood.    Call your doctor now or seek immediate medical care if:    · You have pain that does not get better after you take pain medicine.     · You have symptoms of a blood clot in your leg (called a deep vein thrombosis), such as:  ? Pain in the calf, back of the knee, thigh, or groin. ? Redness and swelling in your leg or groin.     · You are bleeding from the incision.     · You have loose stitches, or your incision comes open.     · You have symptoms of infection, such as:  ? Increased pain, swelling, warmth, or redness. ? Red streaks leading from the incision. ? Pus draining from the incision. ? A fever.     · You are sick to your stomach or cannot keep fluids down.    Watch closely for any changes in your health, and be sure to contact your doctor if:    · You do not get better as expected. Where can you learn more? Go to http://vicki-roberto.info/.   Enter N510 in the search box to learn more about \"Rhinoplasty: What to Expect at Home. \"  Current as of: April 18, 2018  Content Version: 11.8  © 2312-3359 [employer]. Care instructions adapted under license by [employer] (which disclaims liability or warranty for this information). If you have questions about a medical condition or this instruction, always ask your healthcare professional. [address] any warranty or liability for your use of this information. DISCHARGE SUMMARY from Nurse    ALL CLOTHING/VALUABLES RETURNED TO PATIENT BEFORE D/C HOME    PATIENT INSTRUCTIONS:    The first meal should be something that is light; not greasy or spicy. If this is tolerated, then advance to regular diet as tolerated. After general anesthesia or intravenous sedation, for 24 hours or while taking prescription Narcotics:  · Limit your activities  · Do not drive and operate hazardous machinery  · Do not make important personal or business decisions  · Do  not drink alcoholic beverages  · If you have not urinated within 8 hours after discharge, please contact your surgeon on call. Pain  · Take pain medication as directed by your doctor  ·  Call your doctor if pain is NOT relieved by medication  · DO NOT take aspirin or blood thinners until directed by your doctor    Report the following to your surgeon:  · Excessive pain, swelling, redness or odor of or around the surgical area  · Temperature over 100.5  · Nausea and vomiting lasting longer than 4 hours or if unable to take medications  · Any signs of decreased circulation or nerve impairment to extremity: change in color, persistent  numbness, tingling, coldness or increase pain  · Any questions    ALSO:  [facility] Phone Calls  · Are usually made nursing staff, the day after your surgery  · If you have any problems, call your doctor as needed      The discharge information has been reviewed.     *  Please give a list of your current medications to your Primary Care Provider. *  Please update this list whenever your medications are discontinued, doses are      changed, or new medications (including over-the-counter products) are added. *  Please carry medication information at all times in case of emergency situations.

## 2018-12-24 NOTE — OP NOTES
OPERATIVE REPORT    NAME: Hayley Kapoor  MRN: [id number]  DATE: 12/23/2018    PREOPERATIVE DIAGNOSES  1. Septal deviation. 2. Nasal septal deformity. 3. Compensatory turbinate hypertrophy. POSTOPERATIVE DIAGNOSES  1. Septal deviation. 2. Nasal septal deformity. 3. Compensatory turbinate hypertrophy. OPERATIVE PROCEDURE:  1. Septorhinoplasty. 2. Bilateral inferior turbinate reduction. SURGEON: Elías Gomez MD    ANESTHESIA: General endotracheal anesthesia. COMPLICATIONS: None. INDICATIONS AND FINDINGS: The patient presented with nasal obstruction and  sinus complaints, identifying problem is that of fixed nasal obstruction  from septal deviation and turbinate hypertrophy with component of dynamic  collapse from sidewall obstruction, where upper lateral cartilage is  dislocated from the caudal nasal bones, problem not helped with allergy  management, as expected for mechanical obstruction, and hence indications  for nasal airway surgery, selecting septoplasty to address buckled nasal  septum which obstructed approximately 85% of the cross section area and  turbinate hypertrophy past the nasal valve and reconstruct the supporting  structure of the nose with a septal columella strut and reconstruction of  the internal valves with batten type grafting and hence indications. DESCRIPTION OF PROCEDURE: In the operating room the patient was induced  under general endotracheal anesthesia following which he was prepped and  draped in the sterile fashion using 1% lidocaine with 1:100,000 parts  epinephrine for local infiltration and bilateral infra-orbital foramen  block. A left-sided [place] incision was made, elevating bilateral  submucoperichondrial flaps to expose the cartilaginous and bony septum. The  bony cartilaginous junction was incised, incising the deflected  perpendicular plate high and low with foam and scissors,removing the  deflected bone with duck bill forceps.  The subluxed portion of septum was  resected with the [place] off the crest using a T-chisel to remove the bony  spurs once the leaflets were elevated into the floor of the nose. There was  such a marked buccal deformity, early obstruction probably being greater  than 90% of the cross sectioned area, that the L-strut required resecting  it at is caudal element, requiring a nasal septal reconstruction, not just  a septoplasty for functional purpose. The remaining L-strut mainly over the  dorsum was scored and morselized minimally to break the membrane of this  curved structure, allowing a septocolumellar graft to brace it in the  midline and restructure this tent pole type anatomic structure. A 2 x 2 cm  plate of straight septal cartilage from the posterior inferior aspect of  the septum was then beveled and shaped. A pocket was created between the  medial crura anteriorly with a retrograde dissection. Septocolumella  reconstruction was next planned, placing a 4-0 gut suture around the  septocolumellar graft and advancing this into place, apposing it against  the remaining L-strut septal structure, held in place with percutaneous 27  gauge needles and pulled anteriorly with the percutaneous gut suture pulled  through the columella, through the medial crura. Mattress sutures of 5-0  PDS were placed to fix this into position, structurally rigid to resupport  the nose. A figure-of-eight suture was then used to center this on the  maxillary crest periosteum. Remaining straight plates of cartilage and bone  were then positioned in their anatomic place using 3-0 chromic gut suture  to close the incision and mattress the dead space of the leaflet closed. Bilateral intercartilaginous incisions were made elevating the nasal end  within the subnasal SMAS plane to expose the upper lateral cartilages where  they were dislocated from the caudal nasal bones.  Redundant plates of  septal cartilage were then morselized to create wafers of cartilage which  were then placed in a batten type overlay fashion over the upper lateral  cartilages. These were mattressed to the upper lateral cartilages, closing  the intercartilaginous incisions with 5-0 plain gut suture. Stab incisions were made at the base of each inferior turbinate and the  microdebrider turbinate blade was used to core out the parenchyma of each  inferior turbinate, preserving mucosa maximally but resecting portions of  turbinate bone at its medial surface. The bone was further addressed,  outfracturing each inferior turbinate to open up the nasal valve at this  level. Bilateral Ardon splints were applied using a silk suture to fix them  to the membranous septum. The throat pack was removed and the patient then  handed over to anesthesia for awakening and transfer to the recovery room. ESTIMATED BLOOD LOSS: 30 mL. SPECIMEN:      Eun Kelsey MD      cc:   Eun Kelsey MD  12/23/2018  9:35 PM  [facility]    OPERATIVE REPORT    NAME: Hayley Kapoor  MRN: [id number]  DATE: 12/23/2018    PREOPERATIVE DIAGNOSES  1. Septal deviation. 2. Nasal septal deformity. 3. Compensatory turbinate hypertrophy. POSTOPERATIVE DIAGNOSES  1. Septal deviation. 2. Nasal septal deformity. 3. Compensatory turbinate hypertrophy. OPERATIVE PROCEDURE:  1. Septorhinoplasty, functional only. 2. Bilateral inferior turbinate reduction. SURGEON: Elías Gomez MD    ANESTHESIA: General endotracheal anesthesia. COMPLICATIONS: None.   No assisstant  No implant  EBL 30 ml    INDICATIONS AND FINDINGS: The patient presented with nasal obstruction and  sinus complaints, identifying problem is that of fixed nasal obstruction  from septal deviation and turbinate hypertrophy with component of dynamic  collapse from sidewall obstruction, where upper lateral cartilage is  dislocated from the caudal nasal bones, problem not helped with allergy  management, as expected for mechanical obstruction, and hence indications  for nasal airway surgery, selecting septoplasty to address buckled nasal  septum which obstructed approximately 85% of the cross section area and  turbinate hypertrophy past the nasal valve and reconstruct the supporting  structure of the nose  reconstruction of  the internal valves with batten type grafting and hence indications. External twist deformity was so severe that septoplasty alone would not have been adequate to restore nasal airway adequately. DESCRIPTION OF PROCEDURE: In the operating room the patient was induced  under general endotracheal anesthesia following which he was prepped and  draped in the sterile fashion using 1% lidocaine with 1:100,000 parts  epinephrine for local infiltration and bilateral infra-orbital foramen  block. A left-sided [place] incision was made, elevating bilateral  submucoperichondrial flaps to expose the cartilaginous and bony septum. The  bony cartilaginous junction was incised, incising the deflected  perpendicular plate high and low with foam and scissors,removing the  deflected bone with duck bill forceps. The subluxed portion of septum was  resected with the [place] off the crest using a T-chisel to remove the bony  spurs once the leaflets were elevated into the floor of the nose. There was  such a marked buccal deformity, early obstruction probably being greater  than 90% of the cross sectioned area, that the L-strut required resecting  it at is caudal element, requiring a nasal septal reconstruction, not just  a septoplasty for functional purpose. The remaining L-strut mainly over the  dorsum was scored and morselized minimally to break the membrane of this  curved structure, allowing a septocolumellar graft to brace it in the  midline and restructure this tent pole type anatomic structure. A 2 x 2 cm  plate of straight septal cartilage from the posterior inferior aspect of  the septum was then beveled and shaped.  A pocket was created between the  medial crura anteriorly with a retrograde dissection. Septocolumella  reconstruction was next planned, placing a 4-0 gut suture around the  septocolumellar graft and advancing this into place, apposing it against  the remaining L-strut septal structure, held in place with percutaneous 27  gauge needles and pulled anteriorly with the percutaneous gut suture pulled  through the columella, through the medial crura. Mattress sutures of 5-0  PDS were placed to fix this into position, structurally rigid to resupport  the nose. A figure-of-eight suture was then used to center this on the  maxillary crest periosteum. Remaining straight plates of cartilage and bone  were then positioned in their anatomic place using 3-0 chromic gut suture  to close the incision and mattress the dead space of the leaflet closed. Bilateral intercartilaginous incisions were made elevating the nasal end  within the subnasal SMAS plane to expose the upper lateral cartilages where  they were dislocated from the caudal nasal bones. Redundant plates of  septal cartilage were then morselized to create wafers of cartilage which  were then placed in a batten type overlay fashion over the upper lateral  cartilages. These were mattressed to the upper lateral cartilages, closing  the intercartilaginous incisions with 5-0 plain gut suture. Stab incisions were made at the base of each inferior turbinate and the  microdebrider turbinate blade was used to core out the parenchyma of each  inferior turbinate, preserving mucosa maximally but resecting portions of  turbinate bone at its medial surface. The bone was further addressed,  outfracturing each inferior turbinate to open up the nasal valve at this  level. Bilateral Ardon splints were applied using a silk suture to fix them  to the membranous septum.  The throat pack was removed and the patient then  handed over to anesthesia for awakening and transfer to the recovery room.        Farhat Hubbard MD

## (undated) DEVICE — CODMAN® SURGICAL PATTIES 1" X 3" (2.54CM X 7.62CM): Brand: CODMAN®

## (undated) DEVICE — SOLUTION IV 1000ML 0.9% SOD CHL

## (undated) DEVICE — SURGICAL PROCEDURE PACK BASIN MAJ SET CUST NO CAUT

## (undated) DEVICE — SUT SLK 3-0 30IN SH BLK --

## (undated) DEVICE — SPLINT 1524050 5PK PAIR DOYLE II AIRWAY: Brand: DOYLE II ™

## (undated) DEVICE — MASTISOL ADHESIVE LIQ 2/3ML

## (undated) DEVICE — APPLICATOR FBR TIP L6IN COT TIP WOOD SHFT SWAB 2000 PER CA

## (undated) DEVICE — INTENDED FOR TISSUE SEPARATION, AND OTHER PROCEDURES THAT REQUIRE A SHARP SURGICAL BLADE TO PUNCTURE OR CUT.: Brand: BARD-PARKER ® CARBON RIB-BACK BLADES

## (undated) DEVICE — PACK,EENT,TURBAN DRAPE,PK II: Brand: MEDLINE

## (undated) DEVICE — SUTURE CHROMIC GUT SZ 3-0 L27IN ABSRB BRN L19MM FS-2 3/8 636H

## (undated) DEVICE — GOWN,AURORA,NON-REINFORCED,2XL: Brand: MEDLINE

## (undated) DEVICE — SUTURE PDS II SZ 5-0 L18IN ABSRB UD P-3 L13MM 3/8 CIR PRIM Z493G

## (undated) DEVICE — NEEDLE HYPO 25GA L1.5IN BLU POLYPR HUB S STL REG BVL STR

## (undated) DEVICE — (D)STRIP SKN CLSR 0.5X4IN WHT --

## (undated) DEVICE — SUT PLN 5-0 18IN P3 YEL --

## (undated) DEVICE — NEEDLE HYPO 27GA L1.25IN GRY POLYPR HUB S STL REG BVL STR

## (undated) DEVICE — 40418 TRENDELENBURG ONE-STEP ARM PROTECTORS LARGE (1 PAIR): Brand: 40418 TRENDELENBURG ONE-STEP ARM PROTECTORS LARGE (1 PAIR)

## (undated) DEVICE — TOWEL SURG W17XL27IN STD BLU COT NONFENESTRATED PREWASHED

## (undated) DEVICE — KENDALL SCD EXPRESS SLEEVES, KNEE LENGTH, MEDIUM: Brand: KENDALL SCD

## (undated) DEVICE — SPLINT 1529010 10PK THERMASPLINT MEDIUM

## (undated) DEVICE — INFECTION CONTROL KIT SYS

## (undated) DEVICE — Z DISCONTINUEDSOLUTION PREP 2OZ 10% POVIDONE IOD SCR CAP BTL

## (undated) DEVICE — SYR 10ML CTRL LR LCK NSAF LF --

## (undated) DEVICE — BLADE 1882040 5PK INFERIOR TURB 2MM

## (undated) DEVICE — DEVON™ KNEE AND BODY STRAP 60" X 3" (1.5 M X 7.6 CM): Brand: DEVON

## (undated) DEVICE — EYE PADSSTERILENOT MADE WITH NATURAL RUBBER LATEXSINGLE USE ONLYDO NOT USE IF PACKAGE OPENED OR DAMAGED: Brand: CARDINAL HEALTH